# Patient Record
Sex: MALE | Race: OTHER | HISPANIC OR LATINO | URBAN - METROPOLITAN AREA
[De-identification: names, ages, dates, MRNs, and addresses within clinical notes are randomized per-mention and may not be internally consistent; named-entity substitution may affect disease eponyms.]

---
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
## 2022-02-03 ENCOUNTER — APPOINTMENT (OUTPATIENT)
Dept: INTERNAL MEDICINE | Facility: CLINIC | Age: 22
End: 2022-02-03

## 2022-05-31 ENCOUNTER — APPOINTMENT (OUTPATIENT)
Dept: INTERNAL MEDICINE | Facility: CLINIC | Age: 22
End: 2022-05-31
Payer: MEDICAID

## 2022-05-31 VITALS
DIASTOLIC BLOOD PRESSURE: 75 MMHG | RESPIRATION RATE: 16 BRPM | HEIGHT: 69 IN | HEART RATE: 56 BPM | WEIGHT: 213 LBS | TEMPERATURE: 97 F | BODY MASS INDEX: 31.55 KG/M2 | OXYGEN SATURATION: 98 % | SYSTOLIC BLOOD PRESSURE: 140 MMHG

## 2022-05-31 DIAGNOSIS — E66.9 OBESITY, UNSPECIFIED: ICD-10-CM

## 2022-05-31 PROCEDURE — 99385 PREV VISIT NEW AGE 18-39: CPT

## 2022-05-31 NOTE — HISTORY OF PRESENT ILLNESS
[de-identified] : 22 year old male patient with history of stable Obesity, history as stated, presented for an initial annual preventative examination.\par Patient denies any associated symptoms of shortness of breath, chest pain, abdominal pain at this time.\par \par Medication History : No reported medications at this time.

## 2022-05-31 NOTE — ASSESSMENT
Detail Level: Simple [FreeTextEntry1] : 22 year old male found to have stable Obesity,with the current prescription regimen as recommended, diet and life style modifications, as counseled. Prior results reviewed, interpreted and discussed with the patient during today's examination, as appropriate. Follow up, treatment plan and tests, as ordered.\par  Detail Level: Zone

## 2022-05-31 NOTE — REVIEW OF SYSTEMS
"Chief Complaint  Follow-up (2 Month follow up)    Subjective          Alberto Rachel presents to [facility]  History of Present Illness    The patient is  here for follow-up on blood pressure and other chronic conditions. He is supposed to be seeing Dr. Lopez for endocrinology monitoring and managing his diabetes. He is on Rybelsus. As well as metformin for diabetes, Actos additionally. Blood pressure today 149/73 he takes Coreg, Bumex lisinopril, HCTZ.    The patient reports he monitors his blood pressure and blood sugar at home and both readings looks controlled He states he is doing well overall and  he lost weight .He notes a lesion under umbilics which is not tender and he does not recall how long it has been there.    Objective   Vital Signs:  /73 (BP Location: Left arm, Patient Position: Sitting)   Pulse 76   Temp 97.3 °F (36.3 °C) (Temporal)   Resp 18   Ht 172.7 cm (68\")   Wt 97.1 kg (214 lb)   SpO2 97%   BMI 32.54 kg/m²     BMI is >= 30 and <= 34.9 (Class 1 obesity). The following options were offered after discussion: weight loss educational material (shared in after visit summary) and exercise counseling/recommendations      Physical Exam  Vitals reviewed.   Constitutional:       Appearance: Normal appearance. He is well-developed.   HENT:      Head: Normocephalic and atraumatic.      Right Ear: External ear normal.      Left Ear: External ear normal.      Nose: Nose normal.   Eyes:      Conjunctiva/sclera: Conjunctivae normal.      Pupils: Pupils are equal, round, and reactive to light.   Cardiovascular:      Rate and Rhythm: Normal rate.   Pulmonary:      Effort: Pulmonary effort is normal.      Breath sounds: Normal breath sounds.   Abdominal:      General: There is no distension.   Skin:     General: Skin is warm and dry.   Neurological:      General: No focal deficit present.      Mental Status: He is alert and oriented to person, place, and time. "   Psychiatric:         Mood and Affect: Mood and affect normal.         Behavior: Behavior normal.         Thought Content: Thought content normal.         Judgment: Judgment normal.        Result Review :   The following data was reviewed by: Magnus Euceda DO on 05/16/2022:  Common labs    Common Labsle 1/12/22 1/12/22 1/12/22 1/12/22    1131 1131 1131 1133   Glucose  207 (A)     BUN  17     Creatinine  1.32 (A)     eGFR Non African Am  55 (A)     Sodium  135 (A)     Potassium  4.5     Chloride  104     Calcium  9.5     Albumin  4.80     Total Bilirubin  0.4     Alkaline Phosphatase  48     AST (SGOT)  22     ALT (SGPT)  25     Total Cholesterol 173      Triglycerides 171 (A)      HDL Cholesterol 36 (A)      LDL Cholesterol  107 (A)      Hemoglobin A1C   11.92 (A)    Microalbumin, Urine    <1.2   (A) Abnormal value                      Assessment and Plan    Diagnoses and all orders for this visit:    1. Type 2 diabetes mellitus with hyperglycemia, without long-term current use of insulin (HCC) (Primary)  -     Hemoglobin A1c; Future  -     CBC (No Diff); Future  -     Vitamin B12; Future  -     Folate; Future       Continue to follow up with Dr. Lopez. Blood sugars at home are doing much better around 120 fasting.       2. Gastroesophageal reflux disease without esophagitis  -     Hemoglobin A1c; Future  -     CBC (No Diff); Future  -     Vitamin B12; Future  -     Folate; Future    3. Mixed hyperlipidemia  -     Hemoglobin A1c; Future  -     CBC (No Diff); Future  -     Vitamin B12; Future  -     Folate; Future      Take Lipitor 80 mg as prescribed. We will recheck labs before follow-up in 2 months.    4. Essential hypertension  -     Hemoglobin A1c; Future  -     CBC (No Diff); Future  -     Vitamin B12; Future  -     Folate; Future      Borderline controlled, goal less than 140/90. Continue medicines as prescribed. We will monitor and recheck and follow up in 2 months.     5- Chronic arthritis and  pain.  Continue with Celebrex  as needed.     6- Depression and anxiety.    He takes Celexa, continue with medicine as prescribed.     Transcribed from ambient dictation for Magnus Euceda DO by Chris Boone.  05/16/22   16:36 EDT    Patient verbalized consent to the visit recording.           Follow Up   No follow-ups on file.  Patient was given instructions and counseling regarding his condition or for health maintenance advice. Please see specific information pulled into the AVS if appropriate.        [Muscle Pain] : muscle pain [Negative] : Heme/Lymph

## 2022-05-31 NOTE — HEALTH RISK ASSESSMENT
[Good] : ~his/her~  mood as  good [Never] : Never [No] : In the past 12 months have you used drugs other than those required for medical reasons? No [No falls in past year] : Patient reported no falls in the past year [0] : 2) Feeling down, depressed, or hopeless: Not at all (0) [HIV Test offered] : HIV Test offered [None] : None [Feels Safe at Home] : Feels safe at home [Fully functional (bathing, dressing, toileting, transferring, walking, feeding)] : Fully functional (bathing, dressing, toileting, transferring, walking, feeding) [Fully functional (using the telephone, shopping, preparing meals, housekeeping, doing laundry, using] : Fully functional and needs no help or supervision to perform IADLs (using the telephone, shopping, preparing meals, housekeeping, doing laundry, using transportation, managing medications and managing finances) [Smoke Detector] : smoke detector [Carbon Monoxide Detector] : carbon monoxide detector [Seat Belt] :  uses seat belt [Sunscreen] : uses sunscreen [With Patient/Caregiver] : , with patient/caregiver [FreeTextEntry1] : Check up\par  [de-identified] : None [[id number]] : 0 [Change in mental status noted] : No change in mental status noted [Reports changes in hearing] : Reports no changes in hearing [Reports changes in vision] : Reports no changes in vision [Reports changes in dental health] : Reports no changes in dental health [AdvancecareDate] : 05/22

## 2022-06-01 LAB
25(OH)D3 SERPL-MCNC: 22 NG/ML
ALBUMIN SERPL ELPH-MCNC: 4.9 G/DL
ALP BLD-CCNC: 78 U/L
ALT SERPL-CCNC: 34 U/L
ANION GAP SERPL CALC-SCNC: 11 MMOL/L
APPEARANCE: CLEAR
AST SERPL-CCNC: 70 U/L
BACTERIA: NEGATIVE
BASOPHILS # BLD AUTO: 0.03 K/UL
BASOPHILS NFR BLD AUTO: 0.8 %
BILIRUB SERPL-MCNC: 0.9 MG/DL
BILIRUBIN URINE: NEGATIVE
BLOOD URINE: NEGATIVE
BUN SERPL-MCNC: 10 MG/DL
CALCIUM SERPL-MCNC: 9.8 MG/DL
CHLORIDE SERPL-SCNC: 102 MMOL/L
CHOLEST SERPL-MCNC: 168 MG/DL
CO2 SERPL-SCNC: 26 MMOL/L
COLOR: NORMAL
CREAT SERPL-MCNC: 0.94 MG/DL
EGFR: 119 ML/MIN/1.73M2
EOSINOPHIL # BLD AUTO: 0.13 K/UL
EOSINOPHIL NFR BLD AUTO: 3.5 %
ESTIMATED AVERAGE GLUCOSE: 103 MG/DL
GGT SERPL-CCNC: 7 U/L
GLUCOSE QUALITATIVE U: NEGATIVE
GLUCOSE SERPL-MCNC: 87 MG/DL
HBA1C MFR BLD HPLC: 5.2 %
HCT VFR BLD CALC: 43 %
HDLC SERPL-MCNC: 55 MG/DL
HGB BLD-MCNC: 13.9 G/DL
HIV1+2 AB SPEC QL IA.RAPID: NONREACTIVE
HYALINE CASTS: 1 /LPF
IMM GRANULOCYTES NFR BLD AUTO: 0 %
KETONES URINE: NEGATIVE
LDLC SERPL CALC-MCNC: 98 MG/DL
LEUKOCYTE ESTERASE URINE: NEGATIVE
LYMPHOCYTES # BLD AUTO: 1.85 K/UL
LYMPHOCYTES NFR BLD AUTO: 50.1 %
MAN DIFF?: NORMAL
MCHC RBC-ENTMCNC: 29.8 PG
MCHC RBC-ENTMCNC: 32.3 GM/DL
MCV RBC AUTO: 92.1 FL
MICROSCOPIC-UA: NORMAL
MONOCYTES # BLD AUTO: 0.27 K/UL
MONOCYTES NFR BLD AUTO: 7.3 %
NEUTROPHILS # BLD AUTO: 1.41 K/UL
NEUTROPHILS NFR BLD AUTO: 38.3 %
NITRITE URINE: NEGATIVE
NONHDLC SERPL-MCNC: 113 MG/DL
PH URINE: 6
PLATELET # BLD AUTO: 280 K/UL
POTASSIUM SERPL-SCNC: 4.7 MMOL/L
PROT SERPL-MCNC: 7.2 G/DL
PROTEIN URINE: NORMAL
RBC # BLD: 4.67 M/UL
RBC # FLD: 13.5 %
RED BLOOD CELLS URINE: 0 /HPF
SODIUM SERPL-SCNC: 138 MMOL/L
SPECIFIC GRAVITY URINE: 1.02
SQUAMOUS EPITHELIAL CELLS: 0 /HPF
TRIGL SERPL-MCNC: 72 MG/DL
TSH SERPL-ACNC: 2.11 UIU/ML
UROBILINOGEN URINE: NORMAL
WBC # FLD AUTO: 3.69 K/UL
WHITE BLOOD CELLS URINE: 1 /HPF

## 2022-07-12 ENCOUNTER — EMERGENCY (EMERGENCY)
Facility: HOSPITAL | Age: 22
LOS: 1 days | Discharge: HOME | End: 2022-07-12
Attending: EMERGENCY MEDICINE
Payer: MEDICAID

## 2022-07-12 VITALS
SYSTOLIC BLOOD PRESSURE: 126 MMHG | OXYGEN SATURATION: 99 % | HEIGHT: 69 IN | RESPIRATION RATE: 18 BRPM | HEART RATE: 68 BPM | DIASTOLIC BLOOD PRESSURE: 69 MMHG | WEIGHT: 210.1 LBS | TEMPERATURE: 98 F

## 2022-07-12 VITALS
RESPIRATION RATE: 16 BRPM | OXYGEN SATURATION: 98 % | HEART RATE: 70 BPM | DIASTOLIC BLOOD PRESSURE: 69 MMHG | SYSTOLIC BLOOD PRESSURE: 128 MMHG | TEMPERATURE: 99 F

## 2022-07-12 PROCEDURE — 99283 EMERGENCY DEPT VISIT LOW MDM: CPT

## 2022-07-12 PROCEDURE — 99283 EMERGENCY DEPT VISIT LOW MDM: CPT | Mod: 25

## 2022-07-12 PROCEDURE — 73562 X-RAY EXAM OF KNEE 3: CPT

## 2022-07-12 PROCEDURE — 73562 X-RAY EXAM OF KNEE 3: CPT | Mod: 26,LT

## 2022-07-12 RX ORDER — IBUPROFEN 200 MG
600 TABLET ORAL ONCE
Refills: 0 | Status: COMPLETED | OUTPATIENT
Start: 2022-07-12 | End: 2022-07-12

## 2022-07-12 RX ADMIN — Medication 600 MILLIGRAM(S): at 22:40

## 2022-07-12 NOTE — ED PROVIDER NOTE - NS ED ATTENDING STATEMENT MOD
This was a shared visit with the [facility]. I reviewed and verified the documentation and independently performed the documented:

## 2022-07-12 NOTE — ED PROVIDER NOTE - OBJECTIVE STATEMENT
23 y/o M with no pmhx presenting with L knee pain. 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 Had a tournament 3 days ago and has been having worsening knee pain since then, does not reports one specific injury that exacerbated the pain. Symptoms worse with extension and bearing weight. Denies numbness, tingling, weakness. Also reports some swelling.

## 2022-07-12 NOTE — ED PROVIDER NOTE - PATIENT PORTAL LINK FT
You can access the FollowMyHealth Patient Portal offered by [facility] by registering at the following website: [email or web address]. By joining [employer]’s FollowMyHealth portal, you will also be able to view your health information using other applications (apps) compatible with our system.

## 2022-07-12 NOTE — ED PROVIDER NOTE - MUSCULOSKELETAL, MLM
+L knee TTP infrapatellar aspect, no large joint effusion. Full ROM but pain with extension of knee. No obvious laxity of joint. No deformity or ecchymosis. Pulses intact

## 2022-07-12 NOTE — ED PROVIDER NOTE - NSFOLLOWUPCLINICS_GEN_ALL_ED_FT
[facility] Sports Medicine  Sports Medicine  [address] [place] [address]  Phone: [phone]  Fax:     [facility] [place]  Orthopedics  .  [place]   Phone: [phone]  Fax:

## 2022-07-12 NOTE — ED ADULT NURSE NOTE - OBJECTIVE STATEMENT
23 yo M w/ no PMHx presents to ED via waiting room c/o L knee pain. Pt reports he has chronic pain to b/l knees, since playing in rugby tournament on Saturday has had increased pain to L knee worse w/ movement. No known trauma or injury. No obvious deformity or swelling. Pt denies any CP, SOB, cough, N/V, fever, chills, urinary complaints, constipation, diarrhea, HA, dizziness, weakness. Pt A&Ox4, lungs CTA, +central pulses. Abdomen soft, not tender, not distended. Ambulating w/ steady gait, safety and comfort maintained, no acute distress noted at this time. Pt denies any recent travel or known sick contacts.

## 2022-07-12 NOTE — ED PROVIDER NOTE - NSFOLLOWUPINSTRUCTIONS_ED_ALL_ED_FT
Follow up with primary care doctor in 3-5 days.   Take ibuprofen 600 mg by mouth every 6 hours as needed for pain.   Rest the left knee, ice it, keep it elevated.   Ace wrap the left knee.    Follow up with sports medicine 3-5 days.   Return to the ER immediately for worsening symptoms.

## 2022-07-12 NOTE — ED PROVIDER NOTE - CLINICAL SUMMARY MEDICAL DECISION MAKING FREE TEXT BOX
Christian: Patient with left knee pain x few months, worsened after rugby game four days ago. c/o pain at inferior portion and when going up stairs. no fall, no direct trauma. no numbness/tingling. ambulating but with pain. will get xray , pain control, reassess.

## 2022-07-12 NOTE — ED ADULT NURSE NOTE - NSIMPLEMENTINTERV_GEN_ALL_ED
Implemented All Universal Safety Interventions:  [place] to call system. Call bell, personal items and telephone within reach. Instruct patient to call for assistance. Room bathroom lighting operational. Non-slip footwear when patient is off stretcher. Physically safe environment: no spills, clutter or unnecessary equipment. Stretcher in lowest position, wheels locked, appropriate side rails in place.

## 2022-07-13 ENCOUNTER — FORM ENCOUNTER (OUTPATIENT)
Age: 22
End: 2022-07-13

## 2022-07-13 ENCOUNTER — APPOINTMENT (OUTPATIENT)
Dept: ORTHOPEDIC SURGERY | Facility: CLINIC | Age: 22
End: 2022-07-13

## 2022-07-13 DIAGNOSIS — M23.92 UNSPECIFIED INTERNAL DERANGEMENT OF LEFT KNEE: ICD-10-CM

## 2022-07-13 PROCEDURE — 99204 OFFICE O/P NEW MOD 45 MIN: CPT

## 2022-07-19 ENCOUNTER — APPOINTMENT (OUTPATIENT)
Dept: MRI IMAGING | Facility: CLINIC | Age: 22
End: 2022-07-19

## 2022-07-19 ENCOUNTER — APPOINTMENT (OUTPATIENT)
Dept: RADIOLOGY | Facility: CLINIC | Age: 22
End: 2022-07-19

## 2022-07-19 ENCOUNTER — RESULT REVIEW (OUTPATIENT)
Age: 22
End: 2022-07-19

## 2022-07-19 ENCOUNTER — APPOINTMENT (OUTPATIENT)
Dept: SPORTS MEDICINE | Facility: CLINIC | Age: 22
End: 2022-07-19

## 2022-07-19 ENCOUNTER — OUTPATIENT (OUTPATIENT)
Dept: OUTPATIENT SERVICES | Facility: HOSPITAL | Age: 22
LOS: 1 days | End: 2022-07-19

## 2022-07-19 PROCEDURE — 73562 X-RAY EXAM OF KNEE 3: CPT | Mod: 26,LT

## 2022-07-19 PROCEDURE — 73721 MRI JNT OF LWR EXTRE W/O DYE: CPT | Mod: 26,LT

## 2022-07-25 ENCOUNTER — NON-APPOINTMENT (OUTPATIENT)
Age: 22
End: 2022-07-25

## 2022-07-27 ENCOUNTER — APPOINTMENT (OUTPATIENT)
Dept: ORTHOPEDIC SURGERY | Facility: CLINIC | Age: 22
End: 2022-07-27
Payer: MEDICAID

## 2022-07-27 PROCEDURE — XXXXX: CPT | Mod: 1L

## 2022-08-22 ENCOUNTER — APPOINTMENT (OUTPATIENT)
Dept: FAMILY MEDICINE | Facility: CLINIC | Age: 22
End: 2022-08-22

## 2022-08-22 VITALS
HEIGHT: 69 IN | TEMPERATURE: 97 F | BODY MASS INDEX: 31.25 KG/M2 | RESPIRATION RATE: 16 BRPM | OXYGEN SATURATION: 98 % | SYSTOLIC BLOOD PRESSURE: 112 MMHG | HEART RATE: 65 BPM | WEIGHT: 211 LBS | DIASTOLIC BLOOD PRESSURE: 72 MMHG

## 2022-08-22 PROCEDURE — 99213 OFFICE O/P EST LOW 20 MIN: CPT

## 2022-08-22 NOTE — PHYSICAL EXAM
[No Acute Distress] : no acute distress [Well Nourished] : well nourished [Well Developed] : well developed [Well-Appearing] : well-appearing [Normal Sclera/Conjunctiva] : normal sclera/conjunctiva [PERRL] : pupils equal round and reactive to light [EOMI] : extraocular movements intact [Normal Outer Ear/Nose] : the outer ears and nose were normal in appearance [Normal Oropharynx] : the oropharynx was normal [No Lymphadenopathy] : no lymphadenopathy [Supple] : supple [Thyroid Normal, No Nodules] : the thyroid was normal and there were no nodules present [No Respiratory Distress] : no respiratory distress  [No Accessory Muscle Use] : no accessory muscle use [Clear to Auscultation] : lungs were clear to auscultation bilaterally [Normal Rate] : normal rate  [Regular Rhythm] : with a regular rhythm [Normal S1, S2] : normal S1 and S2 [No Varicosities] : no varicosities [No Edema] : there was no peripheral edema [No Extremity Clubbing/Cyanosis] : no extremity clubbing/cyanosis [Soft] : abdomen soft [Non Tender] : non-tender [Non-distended] : non-distended [No HSM] : no HSM [Normal Bowel Sounds] : normal bowel sounds [Normal Posterior Cervical Nodes] : no posterior cervical lymphadenopathy [Normal Anterior Cervical Nodes] : no anterior cervical lymphadenopathy [No CVA Tenderness] : no CVA  tenderness [No Spinal Tenderness] : no spinal tenderness [No Joint Swelling] : no joint swelling [Grossly Normal Strength/Tone] : grossly normal strength/tone [No Rash] : no rash [Coordination Grossly Intact] : coordination grossly intact [No Focal Deficits] : no focal deficits [Normal Gait] : normal gait [Normal Affect] : the affect was normal [Normal Insight/Judgement] : insight and judgment were intact

## 2022-08-22 NOTE — HISTORY OF PRESENT ILLNESS
[FreeTextEntry1] : Follow up  [de-identified] : Patient presents today for a follow up visit. Pt reports he has been taking his Vitamin D supplements once a week for about 3 months. States he feels pretty good. Has been going to physical therapy for his left knee and follows with ortho.

## 2022-08-22 NOTE — REVIEW OF SYSTEMS
[Fever] : no fever [Chills] : no chills [Fatigue] : no fatigue [Discharge] : no discharge [Redness] : no redness [Vision Problems] : no vision problems [Earache] : no earache [Nasal Discharge] : no nasal discharge [Chest Pain] : no chest pain [Palpitations] : no palpitations [Shortness Of Breath] : no shortness of breath [Wheezing] : no wheezing [Nausea] : no nausea [Vomiting] : no vomiting [Dysuria] : no dysuria [Hesitancy] : no hesitancy [Muscle Weakness] : no muscle weakness [Joint Swelling] : no joint swelling [Itching] : no itching [Skin Rash] : no skin rash [Headache] : no headache [Dizziness] : no dizziness [Easy Bleeding] : no easy bleeding [Easy Bruising] : no easy bruising

## 2022-08-22 NOTE — PLAN
[FreeTextEntry1] : 1. Vitamin D Deficiency\par - Pt instructed to c/w current Vitamin C Rx\par - repeat Vitamin D levels ordered -- will f/u with results. If wnl pt instructed to stop Vitamin D rx and c/w OTC daily supplement\par \par 2. Elevated AST\par - PT with AST of 70 on labs done 5/31/22\par - pt asymptomatic and without hepatomegaly\par - repeat CBC and CMP ordered \par \par 3. Left Knee Pain\par - Now resolved. Pt follows with PT and Ortho\par - Of note: completed physical clearance form for pt as he is returning to college this weekend -- Pt is medically cleared for sports pending orthopedic clearance of left knee\par \par \par f/u PRN\par \par

## 2022-08-26 LAB
24R-OH-CALCIDIOL SERPL-MCNC: 63.4 PG/ML
ALBUMIN SERPL ELPH-MCNC: 4.9 G/DL
ALP BLD-CCNC: 81 U/L
ALT SERPL-CCNC: 19 U/L
ANION GAP SERPL CALC-SCNC: 9 MMOL/L
AST SERPL-CCNC: 22 U/L
BASOPHILS # BLD AUTO: 0.04 K/UL
BASOPHILS NFR BLD AUTO: 1 %
BILIRUB SERPL-MCNC: 0.9 MG/DL
BUN SERPL-MCNC: 12 MG/DL
CALCIUM SERPL-MCNC: 9.7 MG/DL
CHLORIDE SERPL-SCNC: 103 MMOL/L
CO2 SERPL-SCNC: 26 MMOL/L
CREAT SERPL-MCNC: 0.97 MG/DL
EGFR: 115 ML/MIN/1.73M2
EOSINOPHIL # BLD AUTO: 0.17 K/UL
EOSINOPHIL NFR BLD AUTO: 4.5 %
GLUCOSE SERPL-MCNC: 85 MG/DL
HCT VFR BLD CALC: 44.1 %
HGB BLD-MCNC: 13.9 G/DL
IMM GRANULOCYTES NFR BLD AUTO: 0 %
LYMPHOCYTES # BLD AUTO: 1.92 K/UL
LYMPHOCYTES NFR BLD AUTO: 50.3 %
MAN DIFF?: NORMAL
MCHC RBC-ENTMCNC: 28.5 PG
MCHC RBC-ENTMCNC: 31.5 GM/DL
MCV RBC AUTO: 90.6 FL
MONOCYTES # BLD AUTO: 0.27 K/UL
MONOCYTES NFR BLD AUTO: 7.1 %
NEUTROPHILS # BLD AUTO: 1.42 K/UL
NEUTROPHILS NFR BLD AUTO: 37.1 %
PLATELET # BLD AUTO: 288 K/UL
POTASSIUM SERPL-SCNC: 4.7 MMOL/L
PROT SERPL-MCNC: 6.7 G/DL
RBC # BLD: 4.87 M/UL
RBC # FLD: 12.8 %
SODIUM SERPL-SCNC: 139 MMOL/L
WBC # FLD AUTO: 3.82 K/UL

## 2022-09-05 ENCOUNTER — RX RENEWAL (OUTPATIENT)
Age: 22
End: 2022-09-05

## 2022-10-10 ENCOUNTER — APPOINTMENT (OUTPATIENT)
Dept: FAMILY MEDICINE | Facility: CLINIC | Age: 22
End: 2022-10-10

## 2022-10-10 VITALS
DIASTOLIC BLOOD PRESSURE: 64 MMHG | OXYGEN SATURATION: 100 % | HEART RATE: 57 BPM | RESPIRATION RATE: 16 BRPM | WEIGHT: 209 LBS | TEMPERATURE: 97.6 F | BODY MASS INDEX: 30.96 KG/M2 | SYSTOLIC BLOOD PRESSURE: 117 MMHG | HEIGHT: 69 IN

## 2022-10-10 PROCEDURE — 99213 OFFICE O/P EST LOW 20 MIN: CPT

## 2022-10-10 NOTE — PHYSICAL EXAM
[No Acute Distress] : no acute distress [Well Nourished] : well nourished [Well Developed] : well developed [Well-Appearing] : well-appearing [Normal Sclera/Conjunctiva] : normal sclera/conjunctiva [PERRL] : pupils equal round and reactive to light [EOMI] : extraocular movements intact [Normal Outer Ear/Nose] : the outer ears and nose were normal in appearance [Normal Oropharynx] : the oropharynx was normal [No Lymphadenopathy] : no lymphadenopathy [Supple] : supple [No Respiratory Distress] : no respiratory distress  [No Accessory Muscle Use] : no accessory muscle use [Clear to Auscultation] : lungs were clear to auscultation bilaterally [Normal Rate] : normal rate  [Regular Rhythm] : with a regular rhythm [Normal S1, S2] : normal S1 and S2 [No Murmur] : no murmur heard [No Edema] : there was no peripheral edema [Soft] : abdomen soft [Non Tender] : non-tender [Non-distended] : non-distended [Normal Bowel Sounds] : normal bowel sounds [No Spinal Tenderness] : no spinal tenderness [No Joint Swelling] : no joint swelling [Grossly Normal Strength/Tone] : grossly normal strength/tone [No Rash] : no rash [Coordination Grossly Intact] : coordination grossly intact [No Focal Deficits] : no focal deficits [Normal Gait] : normal gait [Speech Grossly Normal] : speech grossly normal [Memory Grossly Normal] : memory grossly normal [Normal Affect] : the affect was normal [Alert and Oriented x3] : oriented to person, place, and time [Normal Mood] : the mood was normal [Normal Insight/Judgement] : insight and judgment were intact [de-identified] : Muscle strength 5/5 throughout  [de-identified] : CN II -XII grossly intact. Good hand eye coordination. Good memory recall. Goood concentration

## 2022-10-10 NOTE — REVIEW OF SYSTEMS
[Dizziness] : dizziness [Fever] : no fever [Chills] : no chills [Vision Problems] : no vision problems [Earache] : no earache [Chest Pain] : no chest pain [Shortness Of Breath] : no shortness of breath [Nausea] : no nausea [Vomiting] : no vomiting [Muscle Pain] : no muscle pain [Headache] : no headache [Fainting] : no fainting [Confusion] : no confusion [Unsteady Walk] : no ataxia [Memory Loss] : no memory loss [Anxiety] : no anxiety [Depression] : no depression

## 2022-10-10 NOTE — PLAN
[FreeTextEntry1] : 1. 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 \par - Discussed in detail to avoid symptom triggering activities and to slowly return to normal everyday activities \par - Note provided to excuse pt from physical activity and exams until cleared\par - Pt to follow up if symptoms worsening or do not improve in 1 week.\par

## 2022-10-10 NOTE — HISTORY OF PRESENT ILLNESS
[FreeTextEntry8] : Patient states he sustained a concussion while playing rugby on 9/24/22. He was evaluated by the [place] physician on Tuesday 9/27. He was told to rest. Admits to symptoms of sensitively to light and noise at that time and intermittent headaches and dizziness. States the only time he really rested was Saturday and Sunday. States symptoms returned this Wednesday, but now are improving and now he only has some brain fog in the morning and lightheadedness at times. Denies headaches, n/v, syncope, poor balance double vision, sensitivity to light and sound, ringing in ears, memory problems, fatigue, irritability, depression, weakness, numbness, tingling, loss of bowel or bladder.\par \par - Has not gone back to practice. \par

## 2022-11-23 ENCOUNTER — APPOINTMENT (OUTPATIENT)
Dept: FAMILY MEDICINE | Facility: CLINIC | Age: 22
End: 2022-11-23

## 2022-11-23 PROCEDURE — 36415 COLL VENOUS BLD VENIPUNCTURE: CPT

## 2022-11-30 LAB
BASOPHILS # BLD AUTO: 0.04 K/UL
BASOPHILS NFR BLD AUTO: 1 %
EOSINOPHIL # BLD AUTO: 0.11 K/UL
EOSINOPHIL NFR BLD AUTO: 2.8 %
HCT VFR BLD CALC: 47.8 %
HGB BLD-MCNC: 15.1 G/DL
IMM GRANULOCYTES NFR BLD AUTO: 0 %
LYMPHOCYTES # BLD AUTO: 1.64 K/UL
LYMPHOCYTES NFR BLD AUTO: 42.3 %
MAN DIFF?: NORMAL
MCHC RBC-ENTMCNC: 29.7 PG
MCHC RBC-ENTMCNC: 31.6 GM/DL
MCV RBC AUTO: 93.9 FL
MONOCYTES # BLD AUTO: 0.31 K/UL
MONOCYTES NFR BLD AUTO: 8 %
NEUTROPHILS # BLD AUTO: 1.78 K/UL
NEUTROPHILS NFR BLD AUTO: 45.9 %
PLATELET # BLD AUTO: 311 K/UL
RBC # BLD: 5.09 M/UL
RBC # FLD: 13.1 %
WBC # FLD AUTO: 3.88 K/UL

## 2022-12-18 NOTE — H&P PST ADULT - CARDIOVASCULAR
EMERGENCY DEPARTMENT ENCOUNTER      NAME: Cherie Montgomery  AGE: 38 year old female  YOB: 1984  MRN: [id number]  EVALUATION DATE & TIME: 12/18/2022  8:14 AM    PCP: Katia Rm    ED PROVIDER: Rosie Gardner M.D.      Chief Complaint   Patient presents with     Irregular Heart Beat       FINAL IMPRESSION:  1. Paroxysmal atrial fibrillation (H)        ED COURSE & MEDICAL DECISION MAKING:    Pertinent Labs & Imaging studies reviewed. (See chart for details)  38 year old female presents to the Emergency Department for evaluation of irregular heart rate.  She has a history of paroxysmal atrial fibrillation.  States that she has had 3 previous episodes.  She has been seen by cardiology and is on aspirin 81 mg daily.  This episode started promptly at 2:30 in the morning.  She denies any chest pain or shortness of breath.  She has required cardioversion with her previous episodes.  Chemical conversion has not worked.  ECG demonstrates patient is in A. fib with RVR.  Initial IV diltiazem was given, we did slow her rate, she remained in A. fib and then her rate would again increase.  After 2 doses were given and she had continued resilience to IV medication, electrical cardioversion was done.  Patient was given IV propofol for procedural sedation, tolerated this well and then cardioverted at 120 J.  She returned to normal sinus rhythm.  She will continue on aspirin 81 mg daily and follow-up with cardiology.  At the time of discharge patient was stable, alert.    8:19 AM I met with the patient, obtained history, performed an initial exam, and discussed options and plan for diagnostics and treatment here in the ED.  10:35 AM Rechecked and reevaluated the patient. Updated on returned results. Consented patient for electrical cardioversion.  11:00 AM Rechecked the patient. Performed cardioversion as below.  11:28 AM Rechecked and evaluated the patient.  12:29 PM Rechecked and reevaluated the patient.  Updated on results and plan for discharge - patient agreeable.    Medical Decision Making    History:    Supplemental history from: Documented in HPI, if applicable    External Record(s) reviewed: Documented in [facility], if applicable.    Work Up:    Chart documentation includes differential considered and any EKGs or imaging interpreted by provider.    In additional to work up documented, I considered the following work up: See chart documentation, if applicable.    External consultation:    Discussion of management with another provider: See chart documentation, if applicable    Complicating factors:    Care impacted by chronic illness: None    Care affected by social determinants of health: N/A    Disposition considerations: Discharge. No recommendations on prescription strength medication(s). Admission consideration documented above, if applicable.      At the conclusion of the encounter I discussed the results of all of the tests and the disposition. The questions were answered. The patient or family acknowledged understanding and was agreeable with the care plan.      30 minutes of critical care time     MEDICATIONS GIVEN IN THE EMERGENCY:  Medications   diltiazem (CARDIZEM) injection 15 mg (15 mg Intravenous Given 12/18/22 0838)   0.9% sodium chloride BOLUS (0 mLs Intravenous Stopped 12/18/22 1132)   diltiazem (CARDIZEM) injection 15 mg (15 mg Intravenous Given 12/18/22 0937)   propofol (DIPRIVAN) injection 10 mg/mL vial (60 mg Intravenous Given 12/18/22 1105)       NEW PRESCRIPTIONS STARTED AT TODAY'S ER VISIT  New Prescriptions    No medications on file     =================================================================    [facility]    Patient information was obtained from: patient    Use of : N/A    Cherie HOLLY Montgomery is a 38 year old female with a pertinent history of obesity, hypothyroidism, HTN, paroxsymal atrial fibrillation who presents to this ED by walk in for evaluation of palpitations. Patient with  "history of paroxysmal atrial fibrillation. Reports that she had her first episode of atrial fibrillation in 6/2021 after she had her first baby in 8/2020. Reports that she had her second episode of atrial fibrillation in 7/2022, which was ~3 months after having her second baby. Each episode required electrical cardioversion. Patient reports that she was out at a show and drinking last night. She felt fine the entire night. She got home around 1:00 AM this morning and was feeling tired but otherwise well. Patient woke up around 2:30 AM this morning and says that she \"just felt off.\" She put on her husbands Apple Watch to monitor her heart rate and noticed it rapidly fluctuating. She went to sleep for a few hours but woke up again continuing to feel off. She layed back down and noticed her heart rate shoot up to 160 bpm which had her concerned and prompted her to present to the ED. She says that she does not feel her rapid heart beat, but rather describes the palpitations as a fluttering and jumping sensation in her chest. Denies any associated chest pain or shortness of breath. No lightheadedness. No vomiting.    Patient reports that her family has been sick over the last 2 weeks or so with a mild cough that seems to be improving now. Denies any fevers, chills, or diaphoresis. She has been eating and drinking well. She reports that she is on prenatals and also take levothyroxine. She is a former smoker, reports quitting around 4 years ago. She otherwise denies any complaints or concerns.    Per chart review, patient admitted to [facility] from 6/4 - 6/5/2021 after first episode of atrial fibrillation. She had been having multiple episodes over the week leading up to admission. They were unsure if one time acute onset so admitted for workup rather than cardioversion. They initially treated as SVT with adenosine, rate controlled with diltiazem.    Patient was seen in this ED on 7/15/22 for her second episode of " atrial fibrillation. Rate control with metoprolol was unsuccessful. She was electrically cardioverted and tolerated procedure well. Discharged on aspirin.    REVIEW OF SYSTEMS  Review of Systems   Constitutional: Negative for appetite change, chills, diaphoresis and fever.   Respiratory: Positive for cough. Negative for shortness of breath.    Cardiovascular: Positive for palpitations. Negative for chest pain.   Gastrointestinal: Negative for vomiting.   Neurological: Negative for light-headedness.   All other systems reviewed and are negative.      PAST MEDICAL HISTORY:  Past Medical History:   Diagnosis Date     Anti-phospholipid antibody syndrome (H)      Atrial fibrillation (H)      Dysmenorrhea      Hypertension     with second full term pregnancy     Obese      Thyroid disease     hypothyroid       PAST SURGICAL HISTORY:  Past Surgical History:   Procedure Laterality Date     DILATION AND CURETTAGE N/A 4/18/2018    Procedure: SUCTION DILATION AND CURETTAGE;  Surgeon: Roxanna Moody MD;  Location: [facility];  Service:      DILATION AND CURETTAGE, OPERATIVE HYSTEROSCOPY WITH MORCELLATOR, COMBINED N/A 1/21/2019    Procedure: HYSTEROSCOPY, POLYPECTOMY, POSSIBLE DILATION AND CURETTAGE (dbTwang MORCELLATOR AND FLUID MANAGEMENT);  Surgeon: Katia Rm MD;  Location: [facility]     GYN SURGERY      D & C     TOE SURGERY         CURRENT MEDICATIONS:    acetaminophen (TYLENOL) 325 MG tablet  ibuprofen (ADVIL/MOTRIN) 600 MG tablet  levothyroxine (SYNTHROID/LEVOTHROID) 125 MCG tablet  levothyroxine (SYNTHROID/LEVOTHROID) 50 MCG tablet  Prenatal Vit-Fe Fumarate-FA (PRENATAL PO)      ALLERGIES:  No Known Allergies    FAMILY HISTORY:  Family History   Problem Relation Age of Onset     Bipolar Disorder Other      Depression Other      Alcoholism Other      Attention Deficit Disorder Other        SOCIAL HISTORY:   Social History     Socioeconomic History     Marital status:      Spouse name: None      Number of children: None     Years of education: None     Highest education level: None   Tobacco Use     Smoking status: Former     Types: Cigarettes     Quit date: 2018     Years since quittin.9     Smokeless tobacco: Never   Substance and Sexual Activity     Alcohol use: Not Currently     Comment: 2/month     Drug use: Never     Sexual activity: Yes     Partners: Male       VITALS:  /58   Pulse 78   Temp 97.6  F (36.4  C) (Oral)   Resp 29   Wt 127 kg (280 lb)   SpO2 98%   BMI 48.06 kg/m      PHYSICAL EXAM   Constitutional: Well developed, Well nourished, NAD  HENT: Normocephalic, Atraumatic, Bilateral external ears normal, Oropharynx normal, mucous membranes moist, Nose normal.   Neck- Normal range of motion, No tenderness, Supple, No stridor.  Eyes: PERRL, EOMI, Conjunctiva normal, No discharge.   Respiratory: Normal breath sounds, No respiratory distress  Cardiovascular: Tachycardia, irregularly irregular rhythm.  GI: Bowel sounds normal, Soft, No tenderness,   Musculoskeletal: No edema. Good range of motion in all major joints. No tenderness to palpation or major deformities noted.   Integument: Warm, Dry, No erythema, No rash  Neurologic: Alert & oriented x 3, Normal motor function, Normal sensory function, No focal deficits noted. Normal gait.   Psychiatric: Affect normal, Judgment normal, Mood normal.    LAB:  All pertinent labs reviewed and interpreted.  Results for orders placed or performed during the hospital encounter of 22   Extra Blue Top Tube   Result Value Ref Range    Hold Specimen JIC    Extra Red Top Tube   Result Value Ref Range    Hold Specimen JIC    Extra Green Top (Lithium Heparin) Tube   Result Value Ref Range    Hold Specimen JIC    Extra Purple Top Tube   Result Value Ref Range    Hold Specimen     Basic metabolic panel   Result Value Ref Range    Sodium 140 136 - 145 mmol/L    Potassium 4.6 3.5 - 5.0 mmol/L    Chloride 110 (H) 98 - 107 mmol/L    Carbon Dioxide  (CO2) 20 (L) 22 - 31 mmol/L    Anion Gap 10 5 - 18 mmol/L    Urea Nitrogen 14 8 - 22 mg/dL    Creatinine 0.77 0.60 - 1.10 mg/dL    Calcium 9.3 8.5 - 10.5 mg/dL    Glucose 123 70 - 125 mg/dL    GFR Estimate >90 >60 mL/min/1.73m2   TSH with free T4 reflex   Result Value Ref Range    TSH 5.16 (H) 0.30 - 5.00 uIU/mL   CBC with platelets and differential   Result Value Ref Range    WBC Count 9.2 4.0 - 11.0 10e3/uL    RBC Count 4.81 3.80 - 5.20 10e6/uL    Hemoglobin 14.2 11.7 - 15.7 g/dL    Hematocrit 41.9 35.0 - 47.0 %    MCV 87 78 - 100 fL    MCH 29.5 26.5 - 33.0 pg    MCHC 33.9 31.5 - 36.5 g/dL    RDW 12.3 10.0 - 15.0 %    Platelet Count 352 150 - 450 10e3/uL    % Neutrophils 63 %    % Lymphocytes 26 %    % Monocytes 7 %    % Eosinophils 3 %    % Basophils 1 %    % Immature Granulocytes 0 %    NRBCs per 100 WBC 0 <1 /100    Absolute Neutrophils 5.8 1.6 - 8.3 10e3/uL    Absolute Lymphocytes 2.4 0.8 - 5.3 10e3/uL    Absolute Monocytes 0.6 0.0 - 1.3 10e3/uL    Absolute Eosinophils 0.3 0.0 - 0.7 10e3/uL    Absolute Basophils 0.1 0.0 - 0.2 10e3/uL    Absolute Immature Granulocytes 0.0 <=0.4 10e3/uL    Absolute NRBCs 0.0 10e3/uL       RADIOLOGY:  Reviewed all pertinent imaging. Please see official radiology report.  No orders to display       EKG:    #1  Performed at: 08:20    Impression: Atrial fibrillation with RVR.    Rate: 157 bpm  Rhythm: atrial fibrillation   Axis: *, 48, 53  NM Interval: *  QRS Interval: 72  QTc Interval: 468  ST Changes: None  Comparison: Compared with EKG of 7/15/22, atrial fibrillation has replaced sinus rhythm. Vent. Rate has increased by 74 bpm.    #2  Performed at: 11:07    Impression: Sinus rhythm. Nonspecific ST changes.    Rate: 76 bpm  Rhythm: Sinus  Axis: 66, 46, 38  NM Interval: 156  QRS Interval: 88  QTc Interval: 418  ST Changes: Nonspecific ST changes.  Comparison: Compared with EKG of 8/18/22 at 08:20, sinus rhythm has replaced atrial fibrillation. Vent rate has decreased by 81 bpm.  I  have independently reviewed and interpreted the EKG(s) documented above.    PROCEDURES:     PROCEDURE: Electrical Cardioversion with Procedural Sedation   INDICATIONS: Sedation is required to allow for Cardioversion for Atrial Fibrilation    CARDIOVERSION TYPE: Biphasic, External   SEDATION PROVIDER: Dr Rosie Gardner   CARDIOVERSION PROVIDER: Dr Rosie Gardner   LEVEL OF SEDATION: Deep Sedation    Defined as:  Minimal = Normal response to verbal  Moderate = Responds to verbal and light tactile stimulation  Deep = Responds after repeated painful stimulation   CONSENT: Risks, benefits and alternatives were discussed with and Written consent was obtained from Patient.   PROCEDURE SPECIFIC CHECKLIST COMPLETED: Yes   LAST ORAL INTAKE: Regular Meal > 8 hours   ASA CLASS: 1 - Healthy patient, no medical problems   MALLAMPATI:  I - Faucial pillars, soft palate, and uvula are visible   TIME OUT: Universal protocol was followed. TIME OUT conducted just prior to starting procedure confirmed patient identity, site/side, procedure, patient position, and availability of correct equipment. Yes    Immediately prior to initiation of sedation, reassessment of clinical condition was performed which was unchanged.   MEDICATIONS GIVEN: Propofol, 60 mg, IV    MONITORING: heart rate, cardiac monitor, continuous pulse oximeter, continuous capnometry (end tidal CO2), frequent blood pressure checks, level of consciousness checks, IV access, constant attendance by RN until patient is recovered and constant attendance by MD until patient is stable   RESPONSE: vital signs stable, airway patent and O2 saturations remained >92%   POST-SEDATION ASSESSMENT/PROCEDURE NOTE: CARDIOVERSION: Trial 1: Synchronized shock at 120 joules was Successful    SEDATION:  Lowest level oxygen saturation reached was 95%.    Post procedure patient was alert and responds to verbal stimuli    Patient was monitored during recovery and returned to pre-procedure  baseline.   TOTAL MD DRUG ADMINISTRATION / MONITORING TIME: 15 minutes.   COMPLICATIONS: Patient tolerated procedure well, without complication       I, Chris Nunez, am serving as a scribe to document services personally performed by Dr. Rosie Gardner MD, based on my observation and the provider's statements to me. I, Dr. Rosie Gardner MD attest that Chris Nunez is acting in a scribe capacity, has observed my performance of the services and has documented them in accordance with my direction.    Rosie Gardner M.D.  Emergency Medicine  [facility] EMERGENCY ROOM  [address] [address]  [phone]  Dept: [phone]     Rosie Gardner MD  12/18/22 2962     negative Regular rate & rhythm, normal S1, S2; no murmurs, gallops or rubs; no S3, S4

## 2022-12-25 ENCOUNTER — EMERGENCY (EMERGENCY)
Facility: HOSPITAL | Age: 22
LOS: 1 days | Discharge: HOME | End: 2022-12-25
Attending: EMERGENCY MEDICINE
Payer: MEDICAID

## 2022-12-25 VITALS
WEIGHT: 214.95 LBS | HEART RATE: 60 BPM | RESPIRATION RATE: 16 BRPM | TEMPERATURE: 98 F | DIASTOLIC BLOOD PRESSURE: 75 MMHG | OXYGEN SATURATION: 100 % | HEIGHT: 69 IN | SYSTOLIC BLOOD PRESSURE: 126 MMHG

## 2022-12-25 PROCEDURE — 99283 EMERGENCY DEPT VISIT LOW MDM: CPT

## 2022-12-25 PROCEDURE — 73030 X-RAY EXAM OF SHOULDER: CPT | Mod: 26,RT

## 2022-12-25 PROCEDURE — 73030 X-RAY EXAM OF SHOULDER: CPT

## 2022-12-25 PROCEDURE — 99284 EMERGENCY DEPT VISIT MOD MDM: CPT

## 2022-12-25 RX ORDER — IBUPROFEN 200 MG
600 TABLET ORAL ONCE
Refills: 0 | Status: COMPLETED | OUTPATIENT
Start: 2022-12-25 | End: 2022-12-25

## 2022-12-25 RX ADMIN — Medication 600 MILLIGRAM(S): at 21:53

## 2022-12-25 NOTE — ED PROVIDER NOTE - MUSCULOSKELETAL, MLM
Spine appears normal, range of motion is not limited, no muscle or joint tenderness.  Right elbow NT full ROM.  Right shoulder NT + apprehension sign.

## 2022-12-25 NOTE — ED PROVIDER NOTE - NSFOLLOWUPINSTRUCTIONS_ED_ALL_ED_FT
1- Motrin 600 mg every 6 hours for pain  2- Follow up with Dr Christianson at sports medicine clinic   3- Follow up with Dr Rivers ear nose and throat specialist  4- Return to ER for any new or worsening symptoms 1- Motrin 600 mg every 6 hours for pain  2- Follow up with Dr Christianson at sports medicine clinic   3- Follow up with Dr Rivers ear nose and throat specialist at our ENT clinic or any of the other providers    4- Return to ER for any new or worsening symptoms  5- Keep arm in sling but be sure to move shoulder around about every 4 hours to avoid a frozen shoulder

## 2022-12-25 NOTE — ED PROVIDER NOTE - OBJECTIVE STATEMENT
22-year-old male no past medical history coming in with 2 complaints.  Firstly patient was skiing today had a mechanical fall landed on his right shoulder when he felt a pop.  Since has had pain in the right shoulder thinks he may have dislocated and relocated the shoulder.  Never had any issues with that shoulder before pain is worse with movement nothing makes it better.  Patient also noting that he has been having intermittent pain and swelling of his nose and bilateral nostrils over the past several weeks.  Pain and swelling comes and goes.  No rhinorrhea no fevers no chills.  No headaches visual changes nausea or vomiting.  Nothing makes this complaint better or worse

## 2022-12-25 NOTE — ED PROVIDER NOTE - NSFOLLOWUPCLINICS_GEN_ALL_ED_FT
[facility] - ENT  Otolaryngology (ENT)  [address] [place] [address]  Phone: [phone]  Fax:     [facility] Medicine  Sports Medicine  [address] [place] [address]  Phone: [phone]  Fax:

## 2022-12-25 NOTE — ED PROVIDER NOTE - PATIENT PORTAL LINK FT
You can access the FollowMyHealth Patient Portal offered by [facility] by registering at the following website: [email or web address] By joining [facility]’s FollowMyHealth portal, you will also be able to view your health information using other applications (apps) compatible with our system.

## 2022-12-25 NOTE — ED ADULT NURSE NOTE - CAS ELECT INFOMATION PROVIDED
D/C'd by LAURA Dunbar prior to RN reassessment, pts previous vitals stable, okay for discharge/DC instructions

## 2022-12-25 NOTE — ED PROVIDER NOTE - CARE PLAN
Principal Discharge DX:	Injury of right shoulder  Secondary Diagnosis:	Sinus pain   1 Principal Discharge DX:	Injury of right shoulder  Secondary Diagnosis:	Sinus pain  Secondary Diagnosis:	AC joint pain

## 2022-12-28 ENCOUNTER — NON-APPOINTMENT (OUTPATIENT)
Age: 22
End: 2022-12-28

## 2022-12-28 ENCOUNTER — APPOINTMENT (OUTPATIENT)
Dept: OTOLARYNGOLOGY | Facility: CLINIC | Age: 22
End: 2022-12-28
Payer: MEDICAID

## 2022-12-28 VITALS
DIASTOLIC BLOOD PRESSURE: 66 MMHG | SYSTOLIC BLOOD PRESSURE: 113 MMHG | WEIGHT: 215 LBS | HEIGHT: 69 IN | BODY MASS INDEX: 31.84 KG/M2 | HEART RATE: 57 BPM | TEMPERATURE: 97.3 F

## 2022-12-28 PROCEDURE — 99204 OFFICE O/P NEW MOD 45 MIN: CPT | Mod: 25

## 2022-12-28 RX ORDER — MUPIROCIN 2 G/100G
2 CREAM TOPICAL TWICE DAILY
Qty: 1 | Refills: 5 | Status: DISCONTINUED | COMMUNITY
Start: 2022-12-28 | End: 2022-12-28

## 2022-12-28 NOTE — HISTORY OF PRESENT ILLNESS
[de-identified] : 23 yo male\par Chr tip of nose pimple x months\par painful and tender to touch\par No trauma\par No tx to date\par no other modifying factors\par no nasal or throat complaints\par  [Headache] : no headache [Retro-Orbital Pain] : no retro-orbital pain [Nasal Congestion] : no nasal congestion [Dental Pain] : no dental pain [None] : No associated symptoms are reported.

## 2022-12-28 NOTE — ASSESSMENT
[FreeTextEntry1] : Left Nasal Tip pimple-no cellulitis\par Rx-Keflex,Bactroban ointment\par Cleanse area w/alcohol swab daily\par Derm MD f/u\par f/u here in 10 days

## 2022-12-28 NOTE — PHYSICAL EXAM
[de-identified] : tender pimple on tip of right nose [Midline] : trachea located in midline position [Normal] : no rashes

## 2023-01-03 ENCOUNTER — APPOINTMENT (OUTPATIENT)
Age: 23
End: 2023-01-03
Payer: MEDICAID

## 2023-01-03 DIAGNOSIS — M25.311 OTHER INSTABILITY, RIGHT SHOULDER: ICD-10-CM

## 2023-01-03 PROCEDURE — 99204 OFFICE O/P NEW MOD 45 MIN: CPT

## 2023-01-03 NOTE — HISTORY OF PRESENT ILLNESS
[de-identified] : Attending note.  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  He experienced sudden pain and felt something pop in his right shoulder.  He is right-hand dominant.  He was seen in the emergency department at [facility] where x-rays were performed and were negative.  He reports some improvement but feels some instability in his right shoulder.  He denies any other injury.  He denies any numbness or paresthesia.  He denies any prior injury to his right shoulder.  He will be leaving for [employer] for a semester abroad on January 18.  He has no significant past medical history.  Reports prior fracture of his left clavicle.  He takes no medications and has no allergies.

## 2023-01-03 NOTE — REASON FOR VISIT
n/a [Initial Visit] : an initial visit for [Shoulder Pain] : shoulder pain [Shoulder Injury] : shoulder injury synovium

## 2023-01-03 NOTE — DISCUSSION/SUMMARY
[de-identified] : Attending note.  Impression: #1 right shoulder injury, #2 subluxation versus dislocation of the right shoulder.  #3 positive apprehension test with relocation test on the right shoulder.  Patient has discontinued use of his shoulder sling.  He was provided with a prescription for physical therapy with a home exercise program.  He was advised to avoid activities which have abduction or external rotation of the right shoulder.  He was advised that if he has recurrent injury to his shoulder with dislocation, he may require surgery.  Patient is leaving for [facility] in 2 weeks time.  He is advised to avoid strengthening of the shoulder until he has full range of motion of his right shoulder.  He will return to the office as needed.

## 2023-01-03 NOTE — PHYSICAL EXAM
[de-identified] : Attending note.  Patient is alert and in no acute distress.  Cervical spine is nontender.  SC joint is nontender.  Clavicle is nontender.  There is slight tenderness over the right AC joint.  There is minor tenderness over the lateral right shoulder.  He has limited forward flexion to approximately 90 degrees.  He has limited abduction to approximately 90 degrees.  He also has slightly limited internal rotation of the right shoulder due to pain.  Carlitos test is negative.  Avenue's test is negative.  He has a positive apprehension test and a positive relocation test of the right shoulder.  Neurovascular examination is normal.  There is no arm swelling. [de-identified] : Attending note.  X-rays of the right shoulder performed at [facility] on December 25 were reviewed and there is no evidence of fracture or subluxation.

## 2023-01-03 NOTE — REVIEW OF SYSTEMS
[Arthralgia] : no arthralgia [Joint Pain] : joint pain [Joint Stiffness] : joint stiffness [Joint Swelling] : no joint swelling [Negative] : Heme/Lymph

## 2023-01-05 ENCOUNTER — APPOINTMENT (OUTPATIENT)
Dept: ORTHOPEDIC SURGERY | Facility: CLINIC | Age: 23
End: 2023-01-05

## 2023-01-13 NOTE — ASU DISCHARGE PLAN (ADULT/PEDIATRIC) - DRIVING
No...
Detail Level: Simple
If does not heal within a couple of weeks pt instructed RTC for further evaluation.

## 2023-08-15 ENCOUNTER — APPOINTMENT (OUTPATIENT)
Dept: INTERNAL MEDICINE | Facility: CLINIC | Age: 23
End: 2023-08-15

## 2023-08-16 ENCOUNTER — APPOINTMENT (OUTPATIENT)
Dept: FAMILY MEDICINE | Facility: CLINIC | Age: 23
End: 2023-08-16
Payer: MEDICAID

## 2023-08-16 ENCOUNTER — APPOINTMENT (OUTPATIENT)
Dept: OTOLARYNGOLOGY | Facility: CLINIC | Age: 23
End: 2023-08-16
Payer: MEDICAID

## 2023-08-16 VITALS
HEIGHT: 69 IN | HEART RATE: 61 BPM | BODY MASS INDEX: 31.99 KG/M2 | WEIGHT: 216 LBS | SYSTOLIC BLOOD PRESSURE: 109 MMHG | DIASTOLIC BLOOD PRESSURE: 71 MMHG

## 2023-08-16 VITALS
DIASTOLIC BLOOD PRESSURE: 74 MMHG | HEIGHT: 69 IN | TEMPERATURE: 98.2 F | SYSTOLIC BLOOD PRESSURE: 121 MMHG | HEART RATE: 59 BPM | WEIGHT: 217 LBS | OXYGEN SATURATION: 97 % | BODY MASS INDEX: 32.14 KG/M2

## 2023-08-16 DIAGNOSIS — H93.293 OTHER ABNORMAL AUDITORY PERCEPTIONS, BILATERAL: ICD-10-CM

## 2023-08-16 DIAGNOSIS — H60.333 SWIMMER'S EAR, BILATERAL: ICD-10-CM

## 2023-08-16 PROCEDURE — 99213 OFFICE O/P EST LOW 20 MIN: CPT

## 2023-08-16 PROCEDURE — 92557 COMPREHENSIVE HEARING TEST: CPT

## 2023-08-16 PROCEDURE — 99213 OFFICE O/P EST LOW 20 MIN: CPT | Mod: 25

## 2023-08-16 PROCEDURE — 92567 TYMPANOMETRY: CPT

## 2023-08-16 NOTE — DATA REVIEWED
[de-identified] : An audiogram was ordered and performed including pure tones, tympanometry and speech testing for the patients complaint of hearing loss I have independently reviewed the patient's audiogram from today and my findings include AU Hearing -8k hz. AU Tymp A

## 2023-08-16 NOTE — ASSESSMENT
[FreeTextEntry1] : 23 year M  present follow up for bilateral acute otitis externa and perceived change in hearing.  Personally reviewed Audiogram shows AU Hearing -8k hz. AU Tymp A  Recommend Otitis Externa -Resolved - Educated patient on keeping ears as dry as possible, Avoid sticking anything into the ear canal (i.e. q-tips, hairpins to clean ears), Avoid swimming in polluted o[place], To keep ears dry patient can use hair dryer to blow warm air back and forth over the ear canal.  -Return to clinic as needed or sooner if new/worsen symptoms present

## 2023-08-16 NOTE — HISTORY OF PRESENT ILLNESS
[de-identified] : 23 year old male presents for initial evaluation of bilateral ear infection that he had last 05/5/2023 into early 06/2023. Reports it was treated ear drops and if here to see if it has full resolved.  States there may be some decreased hearing. Denies otalgia, otorrhea, recent fevers, hearing loss, tinnitus, dizziness, vertigo, headaches related to hearing.

## 2023-08-16 NOTE — HEALTH RISK ASSESSMENT
[No] : No [0] : 2) Feeling down, depressed, or hopeless: Not at all (0) [PHQ-2 Negative - No further assessment needed] : PHQ-2 Negative - No further assessment needed [[id number]] : 0 [Never] : Never

## 2023-08-16 NOTE — HISTORY OF PRESENT ILLNESS
[de-identified] : 23yr old presenting for school physical. No significant PMHx. Reports overall being in good health.  Denies headache, dizziness, chest pain, SOB, abdominal pain.

## 2023-10-09 ENCOUNTER — APPOINTMENT (OUTPATIENT)
Dept: INTERNAL MEDICINE | Facility: CLINIC | Age: 23
End: 2023-10-09
Payer: MEDICAID

## 2023-10-09 VITALS
DIASTOLIC BLOOD PRESSURE: 66 MMHG | TEMPERATURE: 98.3 F | WEIGHT: 210 LBS | OXYGEN SATURATION: 98 % | HEART RATE: 62 BPM | BODY MASS INDEX: 31.1 KG/M2 | HEIGHT: 69 IN | SYSTOLIC BLOOD PRESSURE: 107 MMHG

## 2023-10-09 DIAGNOSIS — E55.9 VITAMIN D DEFICIENCY, UNSPECIFIED: ICD-10-CM

## 2023-10-09 DIAGNOSIS — Z00.00 ENCOUNTER FOR GENERAL ADULT MEDICAL EXAMINATION W/OUT ABNORMAL FINDINGS: ICD-10-CM

## 2023-10-09 DIAGNOSIS — D70.9 NEUTROPENIA, UNSPECIFIED: ICD-10-CM

## 2023-10-09 DIAGNOSIS — R74.01 ELEVATION OF LEVELS OF LIVER TRANSAMINASE LEVELS: ICD-10-CM

## 2023-10-09 PROCEDURE — 99395 PREV VISIT EST AGE 18-39: CPT | Mod: 25

## 2023-10-09 RX ORDER — MELOXICAM 15 MG/1
15 TABLET ORAL
Qty: 30 | Refills: 1 | Status: DISCONTINUED | COMMUNITY
Start: 2022-07-13 | End: 2023-10-09

## 2023-10-09 RX ORDER — MUPIROCIN 20 MG/G
2 OINTMENT TOPICAL TWICE DAILY
Qty: 1 | Refills: 5 | Status: DISCONTINUED | COMMUNITY
Start: 2022-12-28 | End: 2023-10-09

## 2023-10-09 RX ORDER — CEPHALEXIN 500 MG/1
500 CAPSULE ORAL TWICE DAILY
Qty: 14 | Refills: 1 | Status: DISCONTINUED | COMMUNITY
Start: 2022-12-28 | End: 2023-10-09

## 2023-10-10 LAB
25(OH)D3 SERPL-MCNC: 17.9 NG/ML
ALBUMIN SERPL ELPH-MCNC: 5 G/DL
ALP BLD-CCNC: 90 U/L
ALT SERPL-CCNC: 24 U/L
ANION GAP SERPL CALC-SCNC: 10 MMOL/L
APPEARANCE: CLEAR
AST SERPL-CCNC: 19 U/L
BACTERIA: NEGATIVE /HPF
BASOPHILS # BLD AUTO: 0.04 K/UL
BASOPHILS NFR BLD AUTO: 0.7 %
BILIRUB SERPL-MCNC: 0.7 MG/DL
BILIRUBIN URINE: NEGATIVE
BLOOD URINE: NEGATIVE
BUN SERPL-MCNC: 10 MG/DL
CALCIUM SERPL-MCNC: 9.8 MG/DL
CAST: 0 /LPF
CHLORIDE SERPL-SCNC: 101 MMOL/L
CHOLEST SERPL-MCNC: 158 MG/DL
CO2 SERPL-SCNC: 28 MMOL/L
COLOR: YELLOW
CREAT SERPL-MCNC: 0.91 MG/DL
EGFR: 123 ML/MIN/1.73M2
EOSINOPHIL # BLD AUTO: 0.13 K/UL
EOSINOPHIL NFR BLD AUTO: 2.3 %
EPITHELIAL CELLS: 0 /HPF
ESTIMATED AVERAGE GLUCOSE: 103 MG/DL
GGT SERPL-CCNC: 11 U/L
GLUCOSE QUALITATIVE U: NEGATIVE MG/DL
GLUCOSE SERPL-MCNC: 89 MG/DL
HBA1C MFR BLD HPLC: 5.2 %
HCT VFR BLD CALC: 44.1 %
HDLC SERPL-MCNC: 54 MG/DL
HGB BLD-MCNC: 14.3 G/DL
IMM GRANULOCYTES NFR BLD AUTO: 0.2 %
KETONES URINE: NEGATIVE MG/DL
LDLC SERPL CALC-MCNC: 91 MG/DL
LEUKOCYTE ESTERASE URINE: NEGATIVE
LYMPHOCYTES # BLD AUTO: 2.14 K/UL
LYMPHOCYTES NFR BLD AUTO: 38.3 %
MAN DIFF?: NORMAL
MCHC RBC-ENTMCNC: 30 PG
MCHC RBC-ENTMCNC: 32.4 GM/DL
MCV RBC AUTO: 92.6 FL
MICROSCOPIC-UA: NORMAL
MONOCYTES # BLD AUTO: 0.35 K/UL
MONOCYTES NFR BLD AUTO: 6.3 %
NEUTROPHILS # BLD AUTO: 2.92 K/UL
NEUTROPHILS NFR BLD AUTO: 52.2 %
NITRITE URINE: NEGATIVE
NONHDLC SERPL-MCNC: 104 MG/DL
PH URINE: 6.5
PLATELET # BLD AUTO: 330 K/UL
POTASSIUM SERPL-SCNC: 4.5 MMOL/L
PROT SERPL-MCNC: 7.2 G/DL
PROTEIN URINE: NEGATIVE MG/DL
RBC # BLD: 4.76 M/UL
RBC # FLD: 13.2 %
RED BLOOD CELLS URINE: 0 /HPF
SODIUM SERPL-SCNC: 139 MMOL/L
SPECIFIC GRAVITY URINE: 1.02
TRIGL SERPL-MCNC: 66 MG/DL
TSH SERPL-ACNC: 0.92 UIU/ML
UROBILINOGEN URINE: 0.2 MG/DL
WBC # FLD AUTO: 5.59 K/UL
WHITE BLOOD CELLS URINE: 1 /HPF

## 2023-10-10 RX ORDER — ERGOCALCIFEROL 1.25 MG/1
1.25 MG CAPSULE, LIQUID FILLED ORAL
Qty: 12 | Refills: 3 | Status: ACTIVE | COMMUNITY
Start: 2022-06-01 | End: 1900-01-01

## 2024-01-09 NOTE — ED ADULT TRIAGE NOTE - SOURCE OF INFORMATION
Chronic condition improved  - Recommend diligent efforts towards healthy diet and exercise  - We will continue atorvastatin 80 mg daily   Patient

## 2024-02-21 NOTE — H&P PST ADULT - NSICDXPASTMEDICALHX_GEN_ALL_CORE_FT
02/21/24 1427   OTHER   Discipline physical therapist   Rehab Time/Intention   Session Not Performed other (see comments)  (RN reports pt remains on strict bedrest after heart cath. Pt scheduled for CABG on 2/22 at 0900. PT will follow up POD#1.)   Therapy Assessment/Plan (PT)   Criteria for Skilled Interventions Met (PT) yes;meets criteria   Recommendation   PT - Next Appointment 02/23/24        No pertinent past medical history

## 2024-06-05 NOTE — ED ADULT NURSE NOTE - NSFALLRSKINDICATORS_ED_ALL_ED
Detail Level: Zone Render In Strict Bullet Format?: No Initiate Treatment: hydrocortisone 2.5 % topical ointment \\nQuantity: 28.35 g  Days Supply: 30\\nSig: Apply to affected area twice a day for 7-10 days Initiate Treatment: doxycycline monohydrate 100 mg capsule \\nQuantity: 14.0 Capsule\\nSig: Take one capsule by mouth twice daily x 7 days\\n\\nmupirocin 2 % topical ointment \\nQuantity: 15.0 g  Days Supply: 30\\nSig: Apply to affected area on stomach twice daily x 1 week no

## 2024-06-09 ENCOUNTER — NON-APPOINTMENT (OUTPATIENT)
Age: 24
End: 2024-06-09

## 2024-06-10 ENCOUNTER — APPOINTMENT (OUTPATIENT)
Age: 24
End: 2024-06-10
Payer: MEDICAID

## 2024-06-10 VITALS
SYSTOLIC BLOOD PRESSURE: 114 MMHG | BODY MASS INDEX: 31.1 KG/M2 | DIASTOLIC BLOOD PRESSURE: 74 MMHG | HEART RATE: 61 BPM | WEIGHT: 210 LBS | OXYGEN SATURATION: 97 % | HEIGHT: 69 IN

## 2024-06-10 DIAGNOSIS — S29.011S STRAIN OF MUSCLE AND TENDON OF FRONT WALL OF THORAX, SEQUELA: ICD-10-CM

## 2024-06-10 PROCEDURE — 99203 OFFICE O/P NEW LOW 30 MIN: CPT

## 2024-06-10 NOTE — PHYSICAL EXAM
[de-identified] :   Shoulder (left)   Inspection Skin: 2 large scars 1 overlying the left clavicle and one overlying the anterior aspect of the shoulder and axilla well-healed. Scapular winging: none   Palpation Tenderness: Mild Location: Over the pectoralis surgical scar   ROM Flexion-limited active and passively to 120 degrees Abduction -limited actively and passively to 90 degrees Rotation (internal) -limited actively and passively to L4 Rotation (external) -limited actively and passively to 5 degrees     Motor Strength Flexion- 4+/5 Abduction - 4+/5 Rotation (internal) - 5/5 Rotation (external) - 4+/5   Stability- normal Sensory index- normal

## 2024-06-10 NOTE — HISTORY OF PRESENT ILLNESS
[de-identified] :  ROBERT FREDERICK is a 24 year male with PSH of left clavicle fracture fixation who sustained an injury to his Lt pectoral tendon while in school in [place] approx. 2 months ago.  He was a senior at [employer] where he played rugby and sustained this injury during a rugby match.  While in [place], he underwent a surgical procedure and began a period of physical therapy for approx. 2-3 weeks. He came today (6/10/24) to get a renewed referral for physical therapy in [facility].  Overall he is doing well from a pain standpoint and has slowly been progressing with function.  States his surgeon in [place] did not give him any specific restrictions and discussed activity as tolerated at this point only to avoid heavy weight lifting in the upper body for at least 6 months.

## 2024-06-10 NOTE — ADDENDUM
[FreeTextEntry1] :  Leatha GUALLPA ATC, assisted with history and documentation for Dr. Gongora on this date 06/10/2024.

## 2024-06-10 NOTE — DISCUSSION/SUMMARY
[de-identified] : ROBERT FREDERICK is a 24 year male who sustained an injury to his Lt pectoral tendon while in school in [place] approx. 2 months ago. While in [place], he underwent a surgical procedure and began a period of physical therapy for approx. 2-3 weeks.  Overall healing and progressing well.  Discussed he can start jogging as well as cycling as long as it is indoors or in a noncrowded space to prevent possible fall and reinjury.  Discussed he may start lower body machine exercises.  He came today (6/10/24) to get a renewed referral for physical therapy in [facility].   Plan:  1.  Updated PT referral given 2.  Patient will follow-up in 3 to 6 months

## 2024-08-15 NOTE — ED PROVIDER NOTE - NSTIMEPROVIDERCAREINITIATE_GEN_ER

## 2024-08-20 ENCOUNTER — APPOINTMENT (OUTPATIENT)
Age: 24
End: 2024-08-20

## 2024-09-19 ENCOUNTER — EMERGENCY (EMERGENCY)
Facility: HOSPITAL | Age: 24
LOS: 1 days | Discharge: HOME | End: 2024-09-19
Attending: EMERGENCY MEDICINE
Payer: MEDICAID

## 2024-09-19 VITALS
OXYGEN SATURATION: 99 % | DIASTOLIC BLOOD PRESSURE: 76 MMHG | HEART RATE: 58 BPM | RESPIRATION RATE: 15 BRPM | SYSTOLIC BLOOD PRESSURE: 118 MMHG | TEMPERATURE: 98 F

## 2024-09-19 VITALS
HEIGHT: 69 IN | OXYGEN SATURATION: 100 % | DIASTOLIC BLOOD PRESSURE: 78 MMHG | TEMPERATURE: 98 F | SYSTOLIC BLOOD PRESSURE: 123 MMHG | HEART RATE: 63 BPM | WEIGHT: 222.01 LBS | RESPIRATION RATE: 16 BRPM

## 2024-09-19 PROCEDURE — 99284 EMERGENCY DEPT VISIT MOD MDM: CPT

## 2024-09-19 PROCEDURE — 99283 EMERGENCY DEPT VISIT LOW MDM: CPT

## 2024-09-19 RX ORDER — IBUPROFEN 600 MG
600 TABLET ORAL ONCE
Refills: 0 | Status: COMPLETED | OUTPATIENT
Start: 2024-09-19 | End: 2024-09-19

## 2024-09-19 RX ORDER — ACETAMINOPHEN 325 MG/1
975 TABLET ORAL ONCE
Refills: 0 | Status: COMPLETED | OUTPATIENT
Start: 2024-09-19 | End: 2024-09-19

## 2024-09-19 RX ADMIN — ACETAMINOPHEN 975 MILLIGRAM(S): 325 TABLET ORAL at 18:16

## 2024-09-19 RX ADMIN — Medication 600 MILLIGRAM(S): at 18:17

## 2024-09-19 NOTE — ED ADULT NURSE NOTE - OBJECTIVE STATEMENT
25 yo male with no significant PMH presents to the ED ambulatory with steady gait from home complaining of head injury. Patient reports that he was working out yesterday when he sustained his head injury. Denies any LOC or AC use. No laceration or abrasion or contusions noted. Patient is otherwise well appearing. Complaining of a headache and some "fogginess." Neuro intact.

## 2024-09-19 NOTE — ED PROVIDER NOTE - PHYSICAL EXAMINATION
Attending note.  Patient is alert no acute distress.  Patient has abrasions to the left side of the face without swelling or deformity.  Pupils are 2 mm equal and reactive.  There is no photophobia.  Extraocular muscles are intact.  Neurologic examination is intact and nonfocal.  VOMS testing is positive.  Near point convergence is approximately 15 cm.

## 2024-09-19 NOTE — ED PROVIDER NOTE - OBJECTIVE STATEMENT
Attending note.  Patient was seen in room #33 to the left.  Patient was working out at the gym yesterday when he fell on his face.  Today patient with complaint of headache, fogginess, difficulty sleeping last night.  He reports 2 prior concussions.  The last concussion took about 2 weeks to recover.  He has no history of migraine headaches, affective disorder, learning disability.  He has no other significant past medical history.  Patient works as a  and spends a lot of time on the computer.  Patient did not go to work today.

## 2024-09-19 NOTE — ED PROVIDER NOTE - CLINICAL SUMMARY MEDICAL DECISION MAKING FREE TEXT BOX
Attending note.  Left facial trauma after a fall yesterday with concussive symptoms without loss of consciousness.  No indication for imaging at this time.  VOMS testing was positive.  Return to activity discussed with patient.  No work tomorrow.  May begin resistance training and cardio next Monday at subsymptom threshold.  Return to emergency department if develop severe headache or persistent vomiting.

## 2024-09-19 NOTE — ED PROVIDER NOTE - PATIENT PORTAL LINK FT
You can access the FollowMyHealth Patient Portal offered by [facility] by registering at the following website: [email or web address]. By joining [employer]’s FollowMyHealth portal, you will also be able to view your health information using other applications (apps) compatible with our system.

## 2024-10-03 ENCOUNTER — APPOINTMENT (OUTPATIENT)
Age: 24
End: 2024-10-03
Payer: MEDICAID

## 2024-10-03 VITALS
HEIGHT: 69 IN | HEART RATE: 72 BPM | OXYGEN SATURATION: 98 % | WEIGHT: 224 LBS | DIASTOLIC BLOOD PRESSURE: 72 MMHG | SYSTOLIC BLOOD PRESSURE: 111 MMHG | BODY MASS INDEX: 33.18 KG/M2

## 2024-10-03 DIAGNOSIS — S29.011S STRAIN OF MUSCLE AND TENDON OF FRONT WALL OF THORAX, SEQUELA: ICD-10-CM

## 2024-10-03 PROCEDURE — 99213 OFFICE O/P EST LOW 20 MIN: CPT

## 2024-10-03 NOTE — HISTORY OF PRESENT ILLNESS
[de-identified] :  ROBERT FREDERICK is a 24 year male with PSH of left clavicle fracture fixation who sustained an injury to his Lt pectoral tendon while in school in [place] approx. 2 months ago.  He was a senior at [email or web address] where he played rugby and sustained this injury during a rugby match.  While in [place], he underwent a surgical procedure and began a period of physical therapy for approx. 2-3 weeks. He came today (6/10/24) to get a renewed referral for physical therapy in [facility].  Overall he is doing well from a pain standpoint and has slowly been progressing with function.  States his surgeon in [place] did not give him any specific restrictions and discussed activity as tolerated at this point only to avoid heavy weight lifting in the upper body for at least 6 months.  Interval history: Patient states he was making progress with physical therapy however is going to a different location close to his home wife katalina [facility]ullenjanes physical therapy and needs an updated PT referral.  Also asking if it is okay to start his progression with pressing activities and weightlifting.

## 2024-10-03 NOTE — DISCUSSION/SUMMARY
[de-identified] : ROBERT FREDERICK is a 24 year male who sustained an injury to his Lt pectoral tendon while in school in [place] approx. 2 months ago. While in [place], he underwent a surgical procedure and began a period of physical therapy for approx. 2-3 weeks.  Overall healing and progressing well.  Discussed he can start jogging as well as cycling as long as it is indoors or in a noncrowded space to prevent possible fall and reinjury.  Discussed he may start lower body machine exercises.  He came today (6/10/24) to get a renewed referral for physical therapy in [facility].  Overall patient doing well functionally in terms of range of motion and strength.  Given it has been 6 months cleared for gradual progression and to return to activity.   Plan:  1.  Updated PT for outside facility given 2.  Discussed his original surgeons recommendations were to start pushing activities at 6 months and given it has been 6 months he has been cleared to do so with very gradual progression with his physical therapy team.  He will eventually transition to home exercise program. 3.  Patient will follow-up as needed

## 2024-10-03 NOTE — PHYSICAL EXAM
[de-identified] :   Shoulder (left)   Inspection Skin: 2 large scars 1 overlying the left clavicle and one overlying the anterior aspect of the shoulder and axilla well-healed. Scapular winging: none   Palpation Tenderness: Mild Location: Over the pectoralis surgical scar   ROM Flexion-normal Abduction-normal Rotation-normal internal Rotation-normal external     Motor Strength Flexion- 5-/5 Abduction - 5-/5 Rotation (internal) - 5/5 Rotation (external) - 5-/5   Stability- normal Sensory index- normal

## 2024-10-21 ENCOUNTER — APPOINTMENT (OUTPATIENT)
Dept: NEUROLOGY | Facility: CLINIC | Age: 24
End: 2024-10-21

## 2024-11-04 ENCOUNTER — NON-APPOINTMENT (OUTPATIENT)
Age: 24
End: 2024-11-04

## 2024-11-08 ENCOUNTER — APPOINTMENT (OUTPATIENT)
Dept: OTOLARYNGOLOGY | Facility: CLINIC | Age: 24
End: 2024-11-08
Payer: MEDICAID

## 2024-11-08 VITALS
HEIGHT: 69 IN | BODY MASS INDEX: 33.33 KG/M2 | SYSTOLIC BLOOD PRESSURE: 111 MMHG | WEIGHT: 225 LBS | DIASTOLIC BLOOD PRESSURE: 66 MMHG | HEART RATE: 62 BPM

## 2024-11-08 DIAGNOSIS — H61.23 IMPACTED CERUMEN, BILATERAL: ICD-10-CM

## 2024-11-08 DIAGNOSIS — H81.10 BENIGN PAROXYSMAL VERTIGO, UNSPECIFIED EAR: ICD-10-CM

## 2024-11-08 DIAGNOSIS — T16.2XXA FOREIGN BODY IN LEFT EAR, INITIAL ENCOUNTER: ICD-10-CM

## 2024-11-08 DIAGNOSIS — R42 DIZZINESS AND GIDDINESS: ICD-10-CM

## 2024-11-08 DIAGNOSIS — H92.09 OTALGIA, UNSPECIFIED EAR: ICD-10-CM

## 2024-11-08 DIAGNOSIS — H93.292 OTHER ABNORMAL AUDITORY PERCEPTIONS, LEFT EAR: ICD-10-CM

## 2024-11-08 PROCEDURE — 92567 TYMPANOMETRY: CPT

## 2024-11-08 PROCEDURE — 92557 COMPREHENSIVE HEARING TEST: CPT

## 2024-11-08 PROCEDURE — 99213 OFFICE O/P EST LOW 20 MIN: CPT | Mod: 25

## 2024-12-26 ENCOUNTER — NON-APPOINTMENT (OUTPATIENT)
Age: 24
End: 2024-12-26

## 2024-12-26 ENCOUNTER — APPOINTMENT (OUTPATIENT)
Dept: NEUROSURGERY | Facility: CLINIC | Age: 24
End: 2024-12-26
Payer: MEDICAID

## 2024-12-26 VITALS
OXYGEN SATURATION: 100 % | WEIGHT: 225 LBS | HEART RATE: 54 BPM | BODY MASS INDEX: 33.33 KG/M2 | RESPIRATION RATE: 17 BRPM | HEIGHT: 69 IN | DIASTOLIC BLOOD PRESSURE: 75 MMHG | SYSTOLIC BLOOD PRESSURE: 116 MMHG

## 2024-12-26 DIAGNOSIS — M54.2 CERVICALGIA: ICD-10-CM

## 2024-12-26 DIAGNOSIS — M54.12 RADICULOPATHY, CERVICAL REGION: ICD-10-CM

## 2024-12-26 PROCEDURE — 99204 OFFICE O/P NEW MOD 45 MIN: CPT

## 2024-12-26 RX ORDER — METHOCARBAMOL 500 MG/1
500 TABLET, FILM COATED ORAL
Qty: 60 | Refills: 1 | Status: ACTIVE | COMMUNITY
Start: 2024-12-26 | End: 1900-01-01

## 2025-01-03 ENCOUNTER — APPOINTMENT (OUTPATIENT)
Dept: MRI IMAGING | Facility: CLINIC | Age: 25
End: 2025-01-03
Payer: MEDICAID

## 2025-01-03 PROCEDURE — 72141 MRI NECK SPINE W/O DYE: CPT

## 2025-01-17 ENCOUNTER — APPOINTMENT (OUTPATIENT)
Dept: NEUROSURGERY | Facility: CLINIC | Age: 25
End: 2025-01-17
Payer: MEDICAID

## 2025-01-17 ENCOUNTER — NON-APPOINTMENT (OUTPATIENT)
Age: 25
End: 2025-01-17

## 2025-01-17 VITALS
RESPIRATION RATE: 17 BRPM | DIASTOLIC BLOOD PRESSURE: 79 MMHG | BODY MASS INDEX: 33.33 KG/M2 | OXYGEN SATURATION: 97 % | WEIGHT: 225 LBS | HEART RATE: 69 BPM | SYSTOLIC BLOOD PRESSURE: 126 MMHG | HEIGHT: 69 IN

## 2025-01-17 DIAGNOSIS — S06.0XAA CONCUSSION WITH LOSS OF CONSCIOUSNESS STATUS UNKNOWN, INITIAL ENCOUNTER: ICD-10-CM

## 2025-01-17 PROCEDURE — 99204 OFFICE O/P NEW MOD 45 MIN: CPT

## 2025-01-22 PROBLEM — S06.0XAA CONCUSSION: Status: ACTIVE | Noted: 2025-01-22

## 2025-02-04 ENCOUNTER — NON-APPOINTMENT (OUTPATIENT)
Age: 25
End: 2025-02-04

## 2025-02-13 ENCOUNTER — APPOINTMENT (OUTPATIENT)
Dept: NEUROSURGERY | Facility: CLINIC | Age: 25
End: 2025-02-13